# Patient Record
Sex: MALE | Race: WHITE | NOT HISPANIC OR LATINO | Employment: FULL TIME | URBAN - METROPOLITAN AREA
[De-identification: names, ages, dates, MRNs, and addresses within clinical notes are randomized per-mention and may not be internally consistent; named-entity substitution may affect disease eponyms.]

---

## 2021-10-06 ENCOUNTER — TELEMEDICINE (OUTPATIENT)
Dept: SLEEP MEDICINE | Facility: HOSPITAL | Age: 51
End: 2021-10-06

## 2021-10-06 DIAGNOSIS — E66.09 CLASS 2 OBESITY DUE TO EXCESS CALORIES WITHOUT SERIOUS COMORBIDITY WITH BODY MASS INDEX (BMI) OF 36.0 TO 36.9 IN ADULT: ICD-10-CM

## 2021-10-06 DIAGNOSIS — R06.83 SNORING: Primary | ICD-10-CM

## 2021-10-06 DIAGNOSIS — G47.33 OBSTRUCTIVE SLEEP APNEA, ADULT: ICD-10-CM

## 2021-10-06 PROCEDURE — 99203 OFFICE O/P NEW LOW 30 MIN: CPT | Performed by: INTERNAL MEDICINE

## 2021-10-12 NOTE — PROGRESS NOTES
Subjective   Sameer Tang is a 51 y.o. male is being seen for consultation today at the request of Dr. Salvatore Pickard for the evaluation of snoring and nonrestorative sleep.  You have chosen to receive care through a telehealth visit.  Do you consent to use a video/audio connection for your medical care today? Yes Unable to complete visit using a video connection to the patient. A phone visit was used to complete this visits. Total time of discussion was 35 minutes.    History of Present Illness  Patient complains that he never rested.  He has a history of snoring and was previously on CPAP.  He says he is not using it now.  He awakens every couple of hours during the night.  He sometimes has difficulty getting back to sleep.  He has had snoring noted since he was in high school.  He says he had a study in 2004 and was diagnosed with severe obstructive sleep apnea.  He used CPAP for about 5 years but has been without therapy since then.  He estimates his weight is up about 30 pounds since then.  He denies awakening gasping for breath.  He denies having noted apneas.  He is not rested in the morning.  He denies morning headaches.    He occasionally has a dry mouth or sore throat at night.  He denies ever breaking his nose but does have trouble breathing through his nose and says he has nasal allergies.  He denies falling asleep during the day.  He denies having problems driving.  He denies kicking his legs at night but does admit he sometimes has to move his legs.  He occasionally has hip and knee pain.  He previously had ACL repair    Goes to bed about 9 PM.  He says he falls asleep in about an hour but is reading.  He awakens 3-4 times during the night.  He thinks he gets 5 to 7 hours of sleep but is not rested.  He has had hypertension known for a year.  He denies any history of diabetes coronary artery disease.  Occasional cough during the day.  He is on Vyvanse for ADD.      Allergies: He has seasonal environmental  allergies to molds, hay, ragweed.    Medications: He is on Vyvanse    Social History    Tobacco Use      Smoking status: Without      Smokeless tobacco: Not on file       Social History     Substance and Sexual Activity   Alcohol Use  he has 1-2 drinks per week       Caffeine: He has 2 cups of coffee per day.    History reviewed.  He has a history of ADD    History reviewed.  An ACL repair    History reviewed. No pertinent family history.    The following portions of the patient's history were reviewed and updated as appropriate: allergies, current medications, past family history, past medical history, past social history, past surgical history and problem list.    Review of Systems   Constitutional: Positive for fatigue and unexpected weight change.   HENT: Positive for congestion and postnasal drip.    Eyes: Negative.    Respiratory: Positive for cough.    Cardiovascular: Negative.    Gastrointestinal: Negative.    Endocrine: Negative.    Genitourinary: Negative.    Musculoskeletal: Positive for arthralgias, back pain and joint swelling.   Skin: Negative.    Allergic/Immunologic: Positive for environmental allergies.   Neurological: Positive for headaches.   Hematological: Negative.    Psychiatric/Behavioral: Positive for dysphoric mood. The patient is nervous/anxious.    [place]om score is 12/24    Objective     There were no vitals taken for this visit.     Physical Exam  Patient sounds awake and alert.  He does not seem to be in respiratory distress.  His stated weight is 291 pounds.  His height 6 feet 3 inches.  His body mass index 36.4.    Assessment/Plan   Diagnoses and all orders for this visit:    1. Snoring (Primary)  -     Home Sleep Study; Future    2. Obstructive sleep apnea, adult  -     Home Sleep Study; Future    3. Class 2 obesity due to excess calories without serious comorbidity with body mass index (BMI) of 36.0 to 36.9 in adult    Patient presents with a history as noted above.  He has a history  of snoring and nonrestorative sleep.  He was previously diagnosed with sleep apnea and says he has gained weight since then.  I would suspect that he still has significant obstructive sleep apnea.  We will plan to proceed to home sleep testing.  We have discussed possible therapies including CPAP, weight control, oral appliance, and surgery.  We have discussed the long-term consequences of untreated obstructive sleep apnea.  These include hypertension, diabetes, heart disease, stroke, and dementia.  He is encouraged to lose weight and he says that he is working on his weight.  He is encouraged to avoid alcohol or sedatives close to bedtime.  He is encouraged to practice lateral position sleep.  He is to return in after his study.    Total time: 35 minutes exclusive of procedures.         Erick Pate MD [facility]  Sleep Medicine  Pulmonary and Critical Care Medicine

## 2022-09-08 ENCOUNTER — TRANSCRIBE ORDERS (OUTPATIENT)
Dept: ADMINISTRATIVE | Facility: HOSPITAL | Age: 52
End: 2022-09-08

## 2022-09-08 DIAGNOSIS — R55 NEAR SYNCOPE: Primary | ICD-10-CM

## 2022-09-22 ENCOUNTER — HOSPITAL ENCOUNTER (OUTPATIENT)
Dept: CARDIOLOGY | Facility: HOSPITAL | Age: 52
End: 2022-09-22

## 2022-10-18 NOTE — PROGRESS NOTES
[facility] Cardiology  Consultation H&P  Sameer Tang  1970  [address]  [facility] [address]     VISIT DATE:  10/21/22    PCP: Salvatore Pickard MD  [address]  [facility] [address]    IDENTIFICATION: A 52 y.o. male  and [place]    PROBLEM LIST:  1. Syncope  a. Stress Test -differed too expensive  2. Hypertension  3. Hyperlipidemia  2022 195/116/38/159  4. ANN  5. Iron Storage Disorder ??  6. BMI 35-39.9      CC: new patient, near syncope       Allergies  Allergies   Allergen Reactions   • Lisinopril Cough   • Amoxicillin Rash       Current Medications    Current Outpatient Medications:   •  atorvastatin (LIPITOR) 80 MG tablet, Take  by mouth Daily., Disp: , Rfl:   •  buPROPion XL (WELLBUTRIN XL) 150 MG 24 hr tablet, Take 1 tablet by mouth Every Morning., Disp: , Rfl:   •  losartan-hydrochlorothiazide (HYZAAR) 100-25 MG per tablet, Take  by mouth Daily., Disp: , Rfl:   •  vitamin B-12 (CYANOCOBALAMIN) 1000 MCG tablet, Take 1 tablet by mouth Daily., Disp: , Rfl:   •  vitamin D3 (Vitamin D) 125 MCG (5000 UT) capsule capsule, Take 1 capsule by mouth Daily., Disp: , Rfl:      History of Present Illness   HPI  Sameer Tang is a 52 y.o. year old male with the above mentioned PMH who presents for consult from Salvatore Pickard MD for evaluation of syncope.  Patient had done road biking and noted some dizziness prior to bending over to take off issues and had a syncopal spell for a few seconds.  He noted he was somewhat diaphoretic following this he did not noted no seizure activity and there was no injury other than scuffing his knees  States he had no exercise-induced symptoms and is felt well since then.  Unfortunately he has stopped exercising until further evaluation  He was referred for stress test but he states that [facility] told him it would be $2700 and he deferred.    Pt denies any other chest pain, dyspnea at rest, dyspnea on exertion, orthopnea, PND,  palpitations, lower extremity edema, or claudication. Pt denies history of CHF, DVT, PE, MI, CVA, TIA, or rheumatic fever.       ROS  Review of Systems   Constitutional: Negative for chills, fever, malaise/fatigue, night sweats, weight gain and weight loss.   HENT: Negative for hearing loss and nosebleeds.    Eyes: Negative for blurred vision, vision loss in left eye, vision loss in right eye, visual disturbance and visual halos.   Cardiovascular: Positive for syncope. Negative for chest pain, claudication, cyanosis, dyspnea on exertion, irregular heartbeat, leg swelling, near-syncope, orthopnea, palpitations and paroxysmal nocturnal dyspnea.   Respiratory: Negative for cough, hemoptysis, shortness of breath, snoring and wheezing.    Endocrine: Negative for cold intolerance, heat intolerance, polydipsia, polyphagia and polyuria.   Hematologic/Lymphatic: Negative for adenopathy and bleeding problem. Does not bruise/bleed easily.   Skin: Negative for dry skin, poor wound healing and rash.   Musculoskeletal: Negative for falls, joint pain, joint swelling, muscle cramps, muscle weakness, myalgias and neck pain.   Gastrointestinal: Negative for bloating, abdominal pain, change in bowel habit, bowel incontinence, constipation, diarrhea, dysphagia, excessive appetite, heartburn, hematemesis, hematochezia, jaundice, melena, nausea and vomiting.   Genitourinary: Negative for bladder incontinence, dysuria, flank pain, hematuria, hesitancy and nocturia.   Neurological: Negative for aphonia, excessive daytime sleepiness, dizziness, focal weakness, headaches, light-headedness, loss of balance, seizures, sensory change, tremors, vertigo and weakness.   Psychiatric/Behavioral: Negative for altered mental status, depression, memory loss, substance abuse and suicidal ideas. The patient is not nervous/anxious.    All other systems reviewed and are negative.      SOCIAL HX  Social History     Socioeconomic History   • Marital status:  "   Tobacco Use   • Smoking status: Never   • Smokeless tobacco: Never   Vaping Use   • Vaping Use: Never used   Substance and Sexual Activity   • Alcohol use: Yes     Comment: rarely   • Drug use: Never   • Sexual activity: Defer       FAMILY HX  Family History   Problem Relation Age of Onset   • No Known Problems Mother    • COPD Father        Vitals:    10/21/22 1009   BP: 132/80   BP Location: Right arm   Patient Position: Sitting   Pulse: 80   SpO2: 98%   Weight: 136 kg (299 lb 9.6 oz)   Height: 190.5 cm (75\")     Body mass index is 37.45 kg/m².     PHYSICAL EXAMINATION:  Constitutional:       Appearance: Healthy appearance. Not in distress.   Neck:      Vascular: No JVR. JVD normal.   Pulmonary:      Effort: Pulmonary effort is normal.      Breath sounds: Normal breath sounds. No wheezing. No rhonchi. No rales.   Chest:      Chest wall: Not tender to palpatation.   Cardiovascular:      PMI at left midclavicular line. Normal rate. Regular rhythm. Normal S1. Normal S2.      Murmurs: There is no murmur.      No gallop. No click. No rub.   Pulses:     Intact distal pulses.   Edema:     Peripheral edema absent.   Abdominal:      General: Bowel sounds are normal.      Palpations: Abdomen is soft.      Tenderness: There is no abdominal tenderness.   Musculoskeletal: Normal range of motion.         General: No tenderness. Skin:     General: Skin is warm and dry.   Neurological:      General: No focal deficit present.      Mental Status: Alert and oriented to person, place and time.         Diagnostic Data:    ECG 12 Lead    Date/Time: 10/21/2022 10:56 AM  Performed by: Ruben Wadsworth MD  Authorized by: Ruben Wadsworth MD   Previous ECG: no previous ECG available  Rhythm: sinus rhythm  BPM: 82    Clinical impression: non-specific ECG        CMP CBC thyroid lipid profile from A reviewed    ASSESSMENT:   Diagnosis Plan   1. Vasodepressor syncope        2. Primary hypertension        3. Mixed hyperlipidemia   "          PLAN:  Vasodepressor syncope handout afforded to him mechanism explained to him in the office    Hypertension controlled currently Hyzaar.  Recommended alcohol reduction and reinitiation of noninvasive ventilation    Mixed dyslipidemia patient had discontinued statin therapy he will reinitiate and reevaluate lipid profile 6 to 8 weeks after reinitiation            Salvatore Pickard MD, thank you for referring Mr. Tang for evaluation.  I have forwarded my electronically generated recommendations to you for review.  Please do not hesitate to call with any questions.      Ruben Wadsworth MD, FACC

## 2022-10-21 ENCOUNTER — OFFICE VISIT (OUTPATIENT)
Dept: CARDIOLOGY | Facility: CLINIC | Age: 52
End: 2022-10-21

## 2022-10-21 VITALS
WEIGHT: 299.6 LBS | HEIGHT: 75 IN | DIASTOLIC BLOOD PRESSURE: 80 MMHG | SYSTOLIC BLOOD PRESSURE: 132 MMHG | HEART RATE: 80 BPM | BODY MASS INDEX: 37.25 KG/M2 | OXYGEN SATURATION: 98 %

## 2022-10-21 DIAGNOSIS — E78.2 MIXED HYPERLIPIDEMIA: ICD-10-CM

## 2022-10-21 DIAGNOSIS — R55 VASODEPRESSOR SYNCOPE: Primary | ICD-10-CM

## 2022-10-21 DIAGNOSIS — I10 PRIMARY HYPERTENSION: ICD-10-CM

## 2022-10-21 PROCEDURE — 99203 OFFICE O/P NEW LOW 30 MIN: CPT | Performed by: INTERNAL MEDICINE

## 2022-10-21 PROCEDURE — 93000 ELECTROCARDIOGRAM COMPLETE: CPT | Performed by: INTERNAL MEDICINE

## 2022-10-21 RX ORDER — BUPROPION HYDROCHLORIDE 150 MG/1
150 TABLET ORAL EVERY MORNING
COMMUNITY
Start: 2022-10-12

## 2022-10-21 RX ORDER — ATORVASTATIN CALCIUM 80 MG/1
TABLET, FILM COATED ORAL DAILY
COMMUNITY
Start: 2022-08-05

## 2022-10-21 RX ORDER — LOSARTAN POTASSIUM AND HYDROCHLOROTHIAZIDE 25; 100 MG/1; MG/1
TABLET ORAL DAILY
COMMUNITY
Start: 2022-08-03

## 2022-10-21 RX ORDER — LANOLIN ALCOHOL/MO/W.PET/CERES
1000 CREAM (GRAM) TOPICAL DAILY
COMMUNITY

## 2025-01-31 NOTE — PROGRESS NOTES
Chief Complaint  Snoring and Fatigue    Subjective     History of Present Illness:  Sameer Tang is a 54 y.o. male with ADD, obesity, depression, hypertension, and hyperlipidemia.  The patient is referred by LUPE Pérez with primary care.  Patient was previously seen by Dr. Pate in October 2021 at which time a home sleep test order was placed.  Patient has a history of severe obstructive sleep apnea with an index of 46.3/h noted on sleep study obtained at [facility]. He is not using a device now. His snoring is now worse.  Review of self-reported questionnaire notes symptoms including snoring, witnessed apnea, daytime sleepiness and fatigue, frequent awakening, nonrestorative sleep, heartburn/reflux, dry mouth, nasal allergies, frequent nighttime urination, difficulty staying asleep, lack of concentration, and decreased libido.  Patient reports symptoms have been ongoing for more than 5 years.  He typically goes to bed at 9 PM waking at 6:30 AM on weekdays and weekends.  Patient estimates an average of 5-6 hours of sleep per night and it takes approximately 30 minutes for him to get to sleep.  He will take a 1 hour nap occasionally.  Patient denies use of tobacco, drinks alcohol 2-4 times per month.  He drinks 2 cups of regular coffee daily.  Patient's significant other reports witnessed episodes of apnea which occur multiple times per night.  Additionally, the patient is observed with heavy snoring which occurs continuously, nightly, and in all sleeping positions.    Further details are as follows:    [place] Scale is (out of 24): Total score: 13     Estimated average amount of sleep per night: 5-6 hours  Number of times he wakes up at night: 3-4 times  Difficulty falling back asleep: yes  It usually takes 30 minutes to go to sleep.  He feels sleepy upon waking up: yes  Rotating or night shift work: no    Drowsiness/Sleepiness:  He exhibits the following:  excessive daytime  sleepiness  excessive daytime fatigue  falls asleep watching TV  Occasional naps    Snoring/Breathing:  He exhibits the following:  loud snoring, snores in all sleep positions, quits breathing at night, awakens with dry mouth, and awakens gasping for breath    Head Injury:  He exhibits the following:  No    Reflux:  He describes the following:  wakes up at night with a sour taste or burning sensation in chest- rare    Narcolepsy:  He exhibits the following:  none    RLS/PLMs:  He describes the following:  none    Insomnia:  He describes the following:  problems initiating sleep at night  frequent awakenings  Knee pain    Parasomnia:  He exhibits the following:  excessive sweating while sleeping    Weight:       02/05/25  1226   Weight: (!) 143 kg (315 lb)      Weight change in the last year:  gain: 20 lbs    The patient's relevant past medical, surgical, family, and social history reviewed and updated in Epic as appropriate.    Review of Systems   Constitutional: Negative.    HENT:  Positive for congestion and sneezing.    Eyes: Negative.    Respiratory:  Positive for apnea, cough and wheezing.    Cardiovascular: Negative.    Gastrointestinal: Negative.    Endocrine: Negative.    Genitourinary: Negative.    Musculoskeletal: Negative.    Skin: Negative.    Allergic/Immunologic: Positive for environmental allergies.   Neurological: Negative.    Hematological: Negative.    Psychiatric/Behavioral:  The patient is nervous/anxious.    All other systems reviewed and are negative.      PMH:    Past Medical History:   Diagnosis Date    Hyperlipidemia     Hypertension      Past Surgical History:   Procedure Laterality Date    KNEE ACL RECONSTRUCTION Right        Allergies   Allergen Reactions    Lisinopril Cough    Amoxicillin Rash       MEDS:  Prior to Admission medications    Medication Sig Start Date End Date Taking? Authorizing Provider   atorvastatin (LIPITOR) 80 MG tablet Take  by mouth Daily. 8/5/22   Provider,  "MD Shania   buPROPion XL (WELLBUTRIN XL) 150 MG 24 hr tablet Take 1 tablet by mouth Every Morning. 10/12/22   Shania Jang MD   losartan-hydrochlorothiazide (HYZAAR) 100-25 MG per tablet Take  by mouth Daily. 8/3/22   Shania Jang MD   vitamin B-12 (CYANOCOBALAMIN) 1000 MCG tablet Take 1 tablet by mouth Daily.    Shania Jang MD   vitamin D3 (Vitamin D) 125 MCG (5000 UT) capsule capsule Take 1 capsule by mouth Daily.    Shania Jang MD       FH:  Family History   Problem Relation Age of Onset    No Known Problems Mother     COPD Father        Objective   Vital Signs:  /74 (BP Location: Left arm, Patient Position: Sitting, Cuff Size: Adult)   Pulse 80   Temp 98 °F (36.7 °C)   Ht 190.5 cm (75\")   Wt (!) 143 kg (315 lb)   SpO2 96% Comment: room air  at rest  BMI 39.37 kg/m²     Patient's (Body mass index is 39.37 kg/m².) indicates that they are obese (BMI >30) with health related conditions that include obstructive sleep apnea, hypertension, coronary heart disease, and diabetes mellitus . Weight is worsening.        Physical Exam  Vitals reviewed.   Constitutional:       Appearance: Normal appearance.   HENT:      Head: Normocephalic and atraumatic.      Nose: Nose normal.      Mouth/Throat:      Mouth: Mucous membranes are moist.   Cardiovascular:      Rate and Rhythm: Normal rate and regular rhythm.      Heart sounds: No murmur heard.     No friction rub. No gallop.   Pulmonary:      Effort: Pulmonary effort is normal. No respiratory distress.      Breath sounds: Normal breath sounds. No wheezing or rhonchi.   Neurological:      Mental Status: He is alert and oriented to person, place, and time.   Psychiatric:         Behavior: Behavior normal.       Mallampati Score: IV (only hard palate visible)    Result Review :                  Assessment and Plan  Sameer Tang is a 54 y.o. male with ADD, obesity, depression, hypertension, hyperlipidemia, and ANN.  Patient " has a history of severe obstructive sleep apnea with an index of 46.3/h noted on sleep study obtained at [facility].  Approximately 2005 2006.    He now presents for further evaluation of excessive daytime sleepiness and fatigue, nonrestorative sleep, and concerns for sleep disordered breathing and obstructive sleep apnea. The patient's symptoms, particularly heavy snoring, and apneic episodes, are concerning for significant sleep disordered breathing and obstructive sleep apnea. We will obtain a home sleep test for further evaluation.  The patient will return for follow-up and recommendations after test.  I have discussed weight loss as it pertains to obstructive sleep apnea.    Diagnoses and all orders for this visit:    1. Sleep apnea, unspecified type (Primary)  -     Home Sleep Study; Future    2. Snoring  -     Home Sleep Study; Future    3. Excessive daytime sleepiness  -     Home Sleep Study; Future    4. Morbid (severe) obesity due to excess calories                 I discussed the consequences of uncontrolled sleep apnea including hypertension, heart disease, diabetes, stroke, and dementia. I further discussed sleep apnea therapeutic options including CPAP, Weight loss, Oral dental appliance, and surgery.         Follow Up  Return for Follow up after study.  Patient was given instructions and counseling regarding his condition or for health maintenance advice. Please see specific information pulled into the AVS if appropriate.     LUPE Joseph, [facility]  Pulmonology, Critical Care, and Sleep Medicine

## 2025-02-05 ENCOUNTER — OFFICE VISIT (OUTPATIENT)
Dept: SLEEP MEDICINE | Age: 55
End: 2025-02-05
Payer: COMMERCIAL

## 2025-02-05 VITALS
WEIGHT: 315 LBS | DIASTOLIC BLOOD PRESSURE: 74 MMHG | HEART RATE: 80 BPM | SYSTOLIC BLOOD PRESSURE: 120 MMHG | HEIGHT: 75 IN | BODY MASS INDEX: 39.17 KG/M2 | TEMPERATURE: 98 F | OXYGEN SATURATION: 96 %

## 2025-02-05 DIAGNOSIS — G47.19 EXCESSIVE DAYTIME SLEEPINESS: ICD-10-CM

## 2025-02-05 DIAGNOSIS — G47.30 SLEEP APNEA, UNSPECIFIED TYPE: Primary | ICD-10-CM

## 2025-02-05 DIAGNOSIS — E66.01 MORBID (SEVERE) OBESITY DUE TO EXCESS CALORIES: ICD-10-CM

## 2025-02-05 DIAGNOSIS — R06.83 SNORING: ICD-10-CM

## 2025-02-05 RX ORDER — PROPRANOLOL HYDROCHLORIDE 10 MG/1
10 TABLET ORAL
COMMUNITY
Start: 2025-01-10

## 2025-02-05 RX ORDER — DEXTROAMPHETAMINE SACCHARATE, AMPHETAMINE ASPARTATE MONOHYDRATE, DEXTROAMPHETAMINE SULFATE AND AMPHETAMINE SULFATE 7.5; 7.5; 7.5; 7.5 MG/1; MG/1; MG/1; MG/1
30 CAPSULE, EXTENDED RELEASE ORAL
COMMUNITY
Start: 2025-01-10

## 2025-02-05 RX ORDER — MELOXICAM 15 MG/1
15 TABLET ORAL
COMMUNITY
Start: 2024-12-16

## 2025-03-27 ENCOUNTER — HOSPITAL ENCOUNTER (OUTPATIENT)
Dept: SLEEP MEDICINE | Facility: HOSPITAL | Age: 55
Discharge: HOME | End: 2025-03-27
Admitting: NURSE PRACTITIONER
Payer: COMMERCIAL

## 2025-03-27 VITALS — BODY MASS INDEX: 39.17 KG/M2 | HEIGHT: 75 IN | WEIGHT: 315 LBS

## 2025-03-27 DIAGNOSIS — G47.30 SLEEP APNEA, UNSPECIFIED TYPE: ICD-10-CM

## 2025-03-27 DIAGNOSIS — G47.19 EXCESSIVE DAYTIME SLEEPINESS: ICD-10-CM

## 2025-03-27 DIAGNOSIS — R06.83 SNORING: ICD-10-CM

## 2025-03-27 PROCEDURE — G0399 HOME SLEEP TEST/TYPE 3 PORTA: HCPCS

## 2025-03-28 ENCOUNTER — TELEPHONE (OUTPATIENT)
Dept: SLEEP MEDICINE | Age: 55
End: 2025-03-28
Payer: COMMERCIAL

## 2025-03-28 DIAGNOSIS — G47.33 OBSTRUCTIVE SLEEP APNEA, ADULT: Primary | ICD-10-CM

## 2025-03-28 NOTE — TELEPHONE ENCOUNTER
Patient is understanding of their sleep study results and is agreeable to PAP therapy. They would like to use [facility]. Orders have been faxed to [facility]. Patient's compliance appointment has also been made.

## 2025-06-02 NOTE — PROGRESS NOTES
Sleep Clinic Video Visit Follow Up Note    The patient is located at their home address in [place], [place]. The patient presents today for telehealth service.  This service was conducted via audio/video technology through a secure Sense[employer] video visit connection through Epic.  This provider is located in [facility].  Patient stated they are in a secure environment for the session.  Patient's condition being diagnosed/treated is appropriate for telemedicine.  The provider identified himself as well as his credentials.  The patient, and/or patient's guardian, consent to be seen remotely, and when consent is given they understanding that the consent allows for patient identifiable information to be sent to a third-party as needed.  They may refuse to be seen remotely at any time.  The electronic data is encrypted and password protected, and the patient and/or guardian has been advised of the potential risk to privacy not withstanding such measures.  Patient identifiers used: Name and date of birth.     You have chosen to receive care through a telehealth visit.  Do you consent to use a video connection for your medical care today? Yes     Mode of Visit: Video  Location of patient: -HOME-  Location of provider: +[facility]  You have chosen to receive care through a telehealth visit.  The patient has signed the video visit consent form.  The visit included audio and video interaction. No technical issues occurred during this visit.      Chief Complaint  Follow-up and compliance of PAP therapy    Subjective     History of Present Illness (from previous encounter on 2/5/2025):  Sameer Tang is a 54 y.o. male with ADD, obesity, depression, hypertension, hyperlipidemia, and ANN.  Patient has a history of severe obstructive sleep apnea with an index of 46.3/h noted on sleep study obtained at [facility].  Approximately 2005 2006.     He now presents for further evaluation of excessive daytime  sleepiness and fatigue, nonrestorative sleep, and concerns for sleep disordered breathing and obstructive sleep apnea. The patient's symptoms, particularly heavy snoring, and apneic episodes, are concerning for significant sleep disordered breathing and obstructive sleep apnea. We will obtain a home sleep test for further evaluation.  The patient will return for follow-up and recommendations after test.  I have discussed weight loss as it pertains to obstructive sleep apnea.(End copied text)    A home sleep test was obtained on 3/28/2025 revealing severe obstructive sleep apnea with an AHI of 52.7/H    Interval History:  Sameer Tang is a 55 y.o. male returns for follow up and compliance of PAP therapy. The patient was last seen on 2/5/2025 by me. Overall the patient feels good with regard to therapy. The device appears to be working appropriately. On average the patient sleeps 4-5 hours per night. The patient wake 1 time per night. He feels the pressure may be to low at times.     The patient reports the following changes to their medical and medication history since they were last seen:  None    Further details are as follows:    Cadiz Scale is: 6/24      Weight:  Current Weight: 316 lb        The patient's relevant past medical, surgical, family, and social history reviewed and updated in Epic as appropriate.    PMH:    Past Medical History:   Diagnosis Date    Hyperlipidemia     Hypertension      Past Surgical History:   Procedure Laterality Date    KNEE ACL RECONSTRUCTION Right        Allergies   Allergen Reactions    Lisinopril Cough    Amoxicillin Rash       MEDS:  Prior to Admission medications    Medication Sig Start Date End Date Taking? Authorizing Provider   amphetamine-dextroamphetamine XR (ADDERALL XR) 30 MG 24 hr capsule Take 1 capsule by mouth 1/10/25   Shania Jang MD   atorvastatin (LIPITOR) 80 MG tablet Take  by mouth Daily. 8/5/22   Shania Jang MD   buPROPion XL  "(WELLBUTRIN XL) 150 MG 24 hr tablet Take 1 tablet by mouth Every Morning. 10/12/22   Shania Jang MD   losartan-hydrochlorothiazide (HYZAAR) 100-25 MG per tablet Take  by mouth Daily. 8/3/22   Shania Jang MD   meloxicam (MOBIC) 15 MG tablet 1 tablet. 12/16/24   Shania Jang MD   propranolol (INDERAL) 10 MG tablet 1 tablet. 1/10/25   Shania Jang MD   vitamin B-12 (CYANOCOBALAMIN) 1000 MCG tablet Take 1 tablet by mouth Daily.    Shania Jang MD   vitamin D3 (Vitamin D) 125 MCG (5000 UT) capsule capsule Take 1 capsule by mouth Daily.    Shania Jang MD         FH:  Family History   Problem Relation Age of Onset    No Known Problems Mother     COPD Father        Objective   Vital Signs:  Ht 190.5 cm (75\")   Wt (!) 143 kg (316 lb 5.8 oz)   BMI 39.54 kg/m²     Patient's (Body mass index is 39.54 kg/m².) indicates that they are obese (BMI >30)         Physical Exam  Constitutional:       Appearance: Normal appearance.   Neurological:      Mental Status: He is alert and oriented to person, place, and time.   Psychiatric:         Mood and Affect: Mood normal.         Behavior: Behavior normal.         Thought Content: Thought content normal.         Judgment: Judgment normal.               Result Review :           PAP Report:  AHI: 2.4/h  Days of Usage: 45/45 (100%)  Number of Days Greater than 4 hours: 93%  Average time (days used): 6 hours 10 minutes  95th Percentile Pressure: 10.9 cmH2O  95th percentile leaks: 6.1 L/min  Settings: Auto CPAP-8/18 cm H2O, EPR full-time, EPR level 2, response standard.       Assessment and Plan  Sameer Tang is a 55 y.o. male who returns for follow-up and compliance of PAP therapy.  The pap report has been reviewed.  Overall usage is at 100% with compliance at 93%.  The patient averaged 6 hours and 10 minutes of therapy.  Sleep apnea is well-controlled with an AHI of 2.4/H.  Patient has a history of severe obstructive sleep apnea " with an AHI of 52.7/H.  Patient has had some difficulties with pressure feeling as though he is not getting enough air at times.  I will change pressure setting parameters to increase upper limit to 20 cm H2O, as well as turn off EPR and ramp.  I have asked him to contact me if this does not improve his experience.  I will refill the patient's supplies, and they will return for follow-up and compliance in 1 year or sooner should they have further questions or concerns.     Diagnoses and all orders for this visit:    1. Obstructive sleep apnea, adult (Primary)  -     PAP Therapy    2. Morbid (severe) obesity due to excess calories             The patient continues to use and benefit from PAP therapy.    1. The patient was counseled regarding multimodal approach with healthy nutrition, healthy sleep, regular physical activity, social activities, counseling, and medications. Encouraged to practice lateral sleep position. Avoid alcohol and sedatives close to bedtime.     2.  We will refill supplies x1 year.  Return to clinic 1 year or sooner if symptoms warrant.  Patient gave verbal consent today for video visit. I have reviewed the results of my evaluation and impression and discussed my recommendations in detail with the patient.         Follow Up  Return in about 1 year (around 6/5/2026) for Annual visit, Video visit.  Patient was given instructions and counseling regarding his condition or for health maintenance advice. Please see specific information pulled into the AVS if appropriate.       LUPE Joseph, ACNP-BC  Pulmonology, Critical Care, and Sleep Medicine

## 2025-06-05 ENCOUNTER — TELEMEDICINE (OUTPATIENT)
Dept: SLEEP MEDICINE | Age: 55
End: 2025-06-05
Payer: COMMERCIAL

## 2025-06-05 VITALS — HEIGHT: 75 IN | WEIGHT: 315 LBS | BODY MASS INDEX: 39.17 KG/M2

## 2025-06-05 DIAGNOSIS — G47.33 OBSTRUCTIVE SLEEP APNEA, ADULT: Primary | ICD-10-CM

## 2025-06-05 DIAGNOSIS — E66.01 MORBID (SEVERE) OBESITY DUE TO EXCESS CALORIES: ICD-10-CM
